# Patient Record
Sex: MALE | URBAN - METROPOLITAN AREA
[De-identification: names, ages, dates, MRNs, and addresses within clinical notes are randomized per-mention and may not be internally consistent; named-entity substitution may affect disease eponyms.]

---

## 2024-07-25 ENCOUNTER — OFFICE VISIT (OUTPATIENT)
Dept: CARDIOLOGY | Facility: MEDICAL CENTER | Age: 39
End: 2024-07-25
Attending: INTERNAL MEDICINE
Payer: COMMERCIAL

## 2024-07-25 VITALS
SYSTOLIC BLOOD PRESSURE: 110 MMHG | OXYGEN SATURATION: 98 % | RESPIRATION RATE: 12 BRPM | BODY MASS INDEX: 18.2 KG/M2 | DIASTOLIC BLOOD PRESSURE: 74 MMHG | WEIGHT: 130 LBS | HEIGHT: 71 IN | HEART RATE: 89 BPM

## 2024-07-25 DIAGNOSIS — R00.2 PALPITATIONS: ICD-10-CM

## 2024-07-25 DIAGNOSIS — R42 EPISODIC LIGHTHEADEDNESS: ICD-10-CM

## 2024-07-25 LAB — EKG IMPRESSION: NORMAL

## 2024-07-25 PROCEDURE — 99202 OFFICE O/P NEW SF 15 MIN: CPT | Performed by: INTERNAL MEDICINE

## 2024-07-25 PROCEDURE — 3074F SYST BP LT 130 MM HG: CPT | Performed by: INTERNAL MEDICINE

## 2024-07-25 PROCEDURE — 93246 EXT ECG>7D<15D RECORDING: CPT | Performed by: INTERNAL MEDICINE

## 2024-07-25 PROCEDURE — 93005 ELECTROCARDIOGRAM TRACING: CPT | Performed by: INTERNAL MEDICINE

## 2024-07-25 PROCEDURE — 3078F DIAST BP <80 MM HG: CPT | Performed by: INTERNAL MEDICINE

## 2024-07-25 PROCEDURE — 93010 ELECTROCARDIOGRAM REPORT: CPT | Performed by: INTERNAL MEDICINE

## 2024-07-25 PROCEDURE — 99204 OFFICE O/P NEW MOD 45 MIN: CPT | Performed by: INTERNAL MEDICINE

## 2024-07-25 ASSESSMENT — ENCOUNTER SYMPTOMS
LOSS OF CONSCIOUSNESS: 0
PALPITATIONS: 1
ORTHOPNEA: 0
SHORTNESS OF BREATH: 0
MYALGIAS: 0
DIZZINESS: 1
PND: 0
COUGH: 0

## 2024-08-15 ENCOUNTER — TELEPHONE (OUTPATIENT)
Dept: CARDIOLOGY | Facility: MEDICAL CENTER | Age: 39
End: 2024-08-15
Payer: OTHER GOVERNMENT

## 2024-08-15 NOTE — TELEPHONE ENCOUNTER
ANTONIO EOS to 's nurse, Karley, on 8/15/2024    Preliminary findings:    Sinus rhythm  with an avg rate of 70 bpm    Rare isolated supraventricular ectopy    Rare isolated ventricular ectopy    5 patient events:  SR   VE(s)

## 2024-08-19 ENCOUNTER — TELEPHONE (OUTPATIENT)
Dept: CARDIOLOGY | Facility: MEDICAL CENTER | Age: 39
End: 2024-08-19
Payer: OTHER GOVERNMENT

## 2024-08-19 NOTE — TELEPHONE ENCOUNTER
----- Message from Physician Esequiel Jon M.D. sent at 8/19/2024  9:49 AM PDT -----  Results of cardiac monitor reviewed. Essentially normal results. Please let the patient know that overall, the results are reassuring. No arrhythmias detected. Had rare PACs/PVCs which can be managed conservatively.    ALEK

## 2024-11-12 ENCOUNTER — OFFICE VISIT (OUTPATIENT)
Dept: CARDIOLOGY | Facility: MEDICAL CENTER | Age: 39
End: 2024-11-12
Attending: INTERNAL MEDICINE
Payer: OTHER GOVERNMENT

## 2024-11-12 VITALS
HEART RATE: 86 BPM | HEIGHT: 71 IN | DIASTOLIC BLOOD PRESSURE: 56 MMHG | OXYGEN SATURATION: 95 % | SYSTOLIC BLOOD PRESSURE: 122 MMHG | WEIGHT: 135 LBS | RESPIRATION RATE: 16 BRPM | BODY MASS INDEX: 18.9 KG/M2

## 2024-11-12 DIAGNOSIS — R00.2 PALPITATIONS: ICD-10-CM

## 2024-11-12 DIAGNOSIS — F41.9 ANXIETY: ICD-10-CM

## 2024-11-12 PROCEDURE — 99214 OFFICE O/P EST MOD 30 MIN: CPT | Performed by: INTERNAL MEDICINE

## 2024-11-12 PROCEDURE — 99211 OFF/OP EST MAY X REQ PHY/QHP: CPT | Performed by: INTERNAL MEDICINE

## 2024-11-12 PROCEDURE — 3074F SYST BP LT 130 MM HG: CPT | Performed by: INTERNAL MEDICINE

## 2024-11-12 PROCEDURE — 3078F DIAST BP <80 MM HG: CPT | Performed by: INTERNAL MEDICINE

## 2024-11-12 NOTE — PROGRESS NOTES
"    Cardiology Follow Up Consultation Note    Date of note:   11/12/2024  Primary Care Provider: No primary care provider on file.  Referring Provider: Sandor Ponce MD    Patient Name: Nino Holt     YOB: 1985  MRN:              [id number]    Chief Complaint   Patient presents with    Follow-Up     F/v Dx: Palpitations         History of Present Illness: Mr. Nino Holt is a 39 y.o. male with past medical history significant for prior polysubstance abuse including methamphetamine and heroin, generalized anxiety disorder on buspirone who presents to the cardiology office for follow up.    Patient was initially seen in office on  7/25/2024 due to palpitations.  Symptoms have been ongoing since December 2023.  Are sporadic in nature and described as a fluttering sensation in his chest.  Can last for several minutes before resolving on its own.  For further evaluation, cardiac monitoring was pursued.  He wore Zio patch for approximately 2 weeks.  There were no sustained arrhythmias on his heart monitor.  No evidence of ventricular tachycardia or SVT.  No A-fib.  Noted to have rare ectopy with rare PACs and PVCs.    He presents today for follow-up.  States that he still gets palpitations.  Palpitations are similar in nature to his last office visit.  His physician has been working with him with managing his underlying anxiety disorder.  He tells me that he had an echocardiogram performed at [facility] which shows a normal systolic function with an EF of 55 to 60% and only mild tricuspid regurgitation.      Cardiovascular Risk Factors:  1. Smoking status: Denies  2. Type II Diabetes Mellitus: Denies No results found for: \"HBA1C\"  3. Hypertension: Denies  4. Dyslipidemia: Denies no results found for: \"CHOLSTRLTOT\", \"LDL\", \"HDL\", \"TRIGLYCERIDE\"  5. Family history of early Coronary Artery Disease in a first degree relative (Male less than 55 years of age; Female less than 65 years of " "age): Denies      Review of Systems:  As per HPI.  Review of systems assessed and are negative except as stated above.      History reviewed. No pertinent past medical history.      History reviewed. No pertinent surgical history.      No current outpatient medications on file.     No current facility-administered medications for this visit.         No Known Allergies      History reviewed. No pertinent family history.      Social History     Socioeconomic History    Marital status: Unknown     Spouse name: Not on file    Number of children: Not on file    Years of education: Not on file    Highest education level: Not on file   Occupational History    Not on file   Tobacco Use    Smoking status: Never    Smokeless tobacco: Never   Substance and Sexual Activity    Alcohol use: Never    Drug use: Never    Sexual activity: Not on file   Other Topics Concern    Not on file   Social History Narrative    Not on file     Social Drivers of Health     Financial Resource Strain: Not on file   Food Insecurity: Not on file   Transportation Needs: Not on file   Physical Activity: Not on file   Stress: Not on file   Social Connections: Not on file   Intimate Partner Violence: Not on file   Housing Stability: Not on file         Physical Exam:  Ambulatory Vitals  /56 (BP Location: Left arm, Patient Position: Sitting, BP Cuff Size: Adult)   Pulse 86   Resp 16   Ht 1.803 m (5' 11\")   Wt 61.2 kg (135 lb)   SpO2 95%    Oxygen Therapy:  Pulse Oximetry: 95 %  BP Readings from Last 4 Encounters:   11/12/24 122/56   07/25/24 110/74       Weight/BMI: Body mass index is 18.83 kg/m².  Wt Readings from Last 4 Encounters:   11/12/24 61.2 kg (135 lb)   07/25/24 59 kg (130 lb)         General: Not in acute distress  HEENT: OP clear   Neck:  No carotid bruits, No JVD appreciated  CVS:  RRR, Normal S1, S2. No murmurs, rubs or gallops  Resp: Normal respiratory effort, lungs CTA bilaterally. No rales or rhonchi  Abdomen: Soft, " "non-distended, non-tender to palpation  Skin: No obvious rashes, no cyanosis  Neurological: Alert and oriented x3, moves all extremities, no focal neurologic deficits  Psychiatric: Appropriate affect  Extremities:   No edema      Lab Data Review:  No results found for: \"CHOLSTRLTOT\", \"LDL\", \"HDL\", \"TRIGLYCERIDE\"    No results found for: \"SODIUM\", \"POTASSIUM\", \"CHLORIDE\", \"CO2\", \"GLUCOSE\", \"BUN\", \"CREATININE\", \"BUNCREATRAT\", \"GLOMRATE\"  No results found for: \"ALKPHOSPHAT\", \"ASTSGOT\", \"ALTSGPT\", \"TBILIRUBIN\"   No results found for: \"WBC\", \"HEMOGLOBIN\"  No results found for: \"HBA1C\"      Cardiac Imaging and Procedures Review:    EKG dated :   My personal interpretation is Sinus rhythm , nonspecific ST-T changes    Cardiac Monitor:  Procedures: Zio   Dates of monitorin2024 - 2024   Duration: 13 days 23 hours     Findings:   1. Predominant rhythm was sinus rhythm with an average heart rate of 70 bpm.   2.  There were no episodes of ventricular tachycardia.   3.  No episodes of supraventricular tachycardia.   4.  No episodes of atrial fibrillation.   5.  No significant pauses or high-grade AV block.   6.  Rare PVCs and rare PACs       Assessment & Plan     1. Palpitations        2. Anxiety              Medical Decision Making:  Mr. Nino Holt is a 39 y.o. male with past medical history significant for prior polysubstance abuse including methamphetamine and heroin, generalized anxiety disorder on buspirone who presents to the cardiology office for follow up.    1. Palpitations  -Still with occasional palpitations.  Overall, workup has been unrevealing.  No concerning cardiac arrhythmias.  Only with very rare PACs/PVCs.  I had an extensive discussion with the patient regarding these findings and management for this.  Given that these are only rare, I do not recommend initiation of any therapies for ectopy suppression.  I have a high suspicion that his underlying anxiety disorder is playing a major role in " his symptoms.  I recommend that he follow-up with his PCP for optimization of his generalized anxiety disorder.    2. Anxiety  -Follow-up with his PCP for optimization of generalized anxiety disorder.      It was a pleasure seeing . Nino Holt in the office today. Return if symptoms worsen or fail to improve. Patient is aware to call the cardiology clinic with any questions or concerns.      Esequiel Jon MD, [facility]  Cardiologist, [facility], [facility]  [address] [address]  Phone: [phone]  Fax: [phone]    Please note that this dictation was created using voice recognition software. I have made every reasonable attempt to correct obvious errors, but it is possible there are errors of grammar and possibly content that I did not discover before finalizing the note.